# Patient Record
Sex: MALE | URBAN - METROPOLITAN AREA
[De-identification: names, ages, dates, MRNs, and addresses within clinical notes are randomized per-mention and may not be internally consistent; named-entity substitution may affect disease eponyms.]

---

## 2019-03-03 ENCOUNTER — EMERGENCY (EMERGENCY)
Facility: HOSPITAL | Age: 34
LOS: 1 days | Discharge: HOME | End: 2019-03-03
Admitting: EMERGENCY MEDICINE
Payer: SELF-PAY

## 2019-03-03 VITALS
SYSTOLIC BLOOD PRESSURE: 164 MMHG | HEART RATE: 58 BPM | OXYGEN SATURATION: 98 % | DIASTOLIC BLOOD PRESSURE: 56 MMHG | RESPIRATION RATE: 17 BRPM

## 2019-03-03 VITALS
SYSTOLIC BLOOD PRESSURE: 144 MMHG | RESPIRATION RATE: 14 BRPM | OXYGEN SATURATION: 98 % | HEART RATE: 60 BPM | DIASTOLIC BLOOD PRESSURE: 66 MMHG | TEMPERATURE: 98 F

## 2019-03-03 PROCEDURE — 99284 EMERGENCY DEPT VISIT MOD MDM: CPT | Mod: 25

## 2019-03-03 RX ORDER — SODIUM CHLORIDE 9 MG/ML
1000 INJECTION INTRAMUSCULAR; INTRAVENOUS; SUBCUTANEOUS ONCE
Qty: 0 | Refills: 0 | Status: DISCONTINUED | OUTPATIENT
Start: 2019-03-03 | End: 2019-03-03

## 2019-03-03 NOTE — ED ADULT TRIAGE NOTE - CHIEF COMPLAINT QUOTE
BIBA, picked up in front a club, for altered mental status, possible substance use. Per EMS, pt was 'jumping' and 'spitting all over the place' BIBA, picked up in front a club, for altered mental status, possible substance use. Per EMS, pt was 'jumping' and 'spitting all over the place'. Unable to obtain further hx at this time

## 2019-03-03 NOTE — ED ADULT NURSE NOTE - CHIEF COMPLAINT QUOTE
BIBA, picked up in front a club, for altered mental status, possible substance use. Per EMS, pt was 'jumping' and 'spitting all over the place'. Unable to obtain further hx at this time

## 2019-03-03 NOTE — ED PROVIDER NOTE - CLINICAL SUMMARY MEDICAL DECISION MAKING FREE TEXT BOX
34 y/o M presents to ED with AMS.  Pt became and alert spontaneously. Pt discharged with clear speech, steady gait, oriented x3.  Pt denies any complaints.

## 2019-03-03 NOTE — ED PROVIDER NOTE - PROGRESS NOTE DETAILS
Pt now awake and alert, attempting to get out of bed to use the bathroom.  He is ambulatory with steady gait, oriented x3, clear speech.  Ct head and labs canceled.

## 2019-03-03 NOTE — ED PROVIDER NOTE - OBJECTIVE STATEMENT
34 y/o M presents to ED via EMS.  Per triage note, pt was found jumping and spitting in front of a night club.  Pt arrived agitated but fell asleep with snoring respirations after getting placed on stretcher.

## 2019-03-03 NOTE — ED ADULT NURSE NOTE - NSIMPLEMENTINTERV_GEN_ALL_ED
Implemented All Fall Risk Interventions:  [place] to call system. Call bell, personal items and telephone within reach. Instruct patient to call for assistance. Room bathroom lighting operational. Non-slip footwear when patient is off stretcher. Physically safe environment: no spills, clutter or unnecessary equipment. Stretcher in lowest position, wheels locked, appropriate side rails in place. Provide visual cue, wrist band, yellow gown, etc. Monitor gait and stability. Monitor for mental status changes and reorient to person, place, and time. Review medications for side effects contributing to fall risk. Reinforce activity limits and safety measures with patient and family.

## 2019-03-07 DIAGNOSIS — T50.901A POISONING BY UNSPECIFIED DRUGS, MEDICAMENTS AND BIOLOGICAL SUBSTANCES, ACCIDENTAL (UNINTENTIONAL), INITIAL ENCOUNTER: ICD-10-CM

## 2019-03-07 DIAGNOSIS — R41.82 ALTERED MENTAL STATUS, UNSPECIFIED: ICD-10-CM
